# Patient Record
Sex: MALE | Race: WHITE
[De-identification: names, ages, dates, MRNs, and addresses within clinical notes are randomized per-mention and may not be internally consistent; named-entity substitution may affect disease eponyms.]

---

## 2021-04-14 ENCOUNTER — HOSPITAL ENCOUNTER (OUTPATIENT)
Dept: HOSPITAL 53 - M RAD | Age: 21
End: 2021-04-14
Attending: PHYSICIAN ASSISTANT
Payer: COMMERCIAL

## 2021-04-14 DIAGNOSIS — N50.812: Primary | ICD-10-CM

## 2021-04-14 NOTE — REP
INDICATION:

PAIN AND SWELLING



COMPARISON:

None.



TECHNIQUE:

Gray scale and color Doppler evaluation using linear and curved array transducer with

color Doppler evaluation.



FINDINGS:

The testicles and epididymi are relatively normal in contour, size, echogenicity,

vascularity and overall appearance.  There is no evidence for intratesticular mass

lesion, infectious/inflammatory process, or torsion.  No obvious hydroceles or

varicoceles are identified.



Right testicle measures 5.2 x 2.6 x 3.1 cm.

Left testicle measures 4.9 x 2.5 x 2.9 cm.



IMPRESSION:

Essentially normal scrotal ultrasound.





<Electronically signed by Fausto Rodriguez > 04/14/21 1000

## 2022-01-04 ENCOUNTER — HOSPITAL ENCOUNTER (EMERGENCY)
Dept: HOSPITAL 53 - M ED | Age: 22
LOS: 1 days | Discharge: LEFT BEFORE DISCHARGE | End: 2022-01-05
Payer: COMMERCIAL

## 2022-01-04 VITALS
DIASTOLIC BLOOD PRESSURE: 74 MMHG | WEIGHT: 225.09 LBS | BODY MASS INDEX: 30.49 KG/M2 | HEIGHT: 72 IN | SYSTOLIC BLOOD PRESSURE: 157 MMHG

## 2022-01-04 DIAGNOSIS — Z53.29: Primary | ICD-10-CM
